# Patient Record
Sex: FEMALE | Race: WHITE
[De-identification: names, ages, dates, MRNs, and addresses within clinical notes are randomized per-mention and may not be internally consistent; named-entity substitution may affect disease eponyms.]

---

## 2018-07-31 ENCOUNTER — HOSPITAL ENCOUNTER (EMERGENCY)
Dept: HOSPITAL 14 - H.ER | Age: 28
Discharge: HOME | End: 2018-07-31
Payer: SELF-PAY

## 2018-07-31 VITALS — BODY MASS INDEX: 30.7 KG/M2
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
## 2018-07-31 DIAGNOSIS — Z23: ICD-10-CM

## 2018-07-31 DIAGNOSIS — S51.812A: Primary | ICD-10-CM

## 2018-07-31 DIAGNOSIS — J45.909: ICD-10-CM

## 2018-07-31 DIAGNOSIS — S61.216A: ICD-10-CM
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
